# Patient Record
Sex: MALE | Race: WHITE | NOT HISPANIC OR LATINO | Employment: OTHER | URBAN - METROPOLITAN AREA
[De-identification: names, ages, dates, MRNs, and addresses within clinical notes are randomized per-mention and may not be internally consistent; named-entity substitution may affect disease eponyms.]

---

## 2022-03-10 ENCOUNTER — MEDICAL CORRESPONDENCE (OUTPATIENT)
Dept: HEALTH INFORMATION MANAGEMENT | Facility: CLINIC | Age: 52
End: 2022-03-10
Payer: COMMERCIAL

## 2022-04-04 ENCOUNTER — TRANSCRIBE ORDERS (OUTPATIENT)
Dept: OTHER | Age: 52
End: 2022-04-04

## 2022-04-04 DIAGNOSIS — H49.883 EXTERNAL OPHTHALMOPLEGIA OF BOTH EYES: Primary | ICD-10-CM

## 2022-04-04 DIAGNOSIS — H05.229: ICD-10-CM

## 2022-04-04 DIAGNOSIS — I63.81 LACUNAR INFARCTION (H): ICD-10-CM

## 2022-04-04 DIAGNOSIS — R26.89 IMBALANCE: ICD-10-CM

## 2022-04-04 DIAGNOSIS — H57.10: Primary | ICD-10-CM

## 2022-04-04 DIAGNOSIS — R41.3 MEMORY LOSS: ICD-10-CM

## 2022-05-05 ENCOUNTER — OFFICE VISIT (OUTPATIENT)
Dept: OPHTHALMOLOGY | Facility: CLINIC | Age: 52
End: 2022-05-05
Attending: PSYCHIATRY & NEUROLOGY
Payer: COMMERCIAL

## 2022-05-05 DIAGNOSIS — H53.10 SUBJECTIVE VISUAL DISTURBANCE: Primary | ICD-10-CM

## 2022-05-05 DIAGNOSIS — G23.1 PSP (PROGRESSIVE SUPRANUCLEAR PALSY) (H): Primary | ICD-10-CM

## 2022-05-05 DIAGNOSIS — R26.89 IMBALANCE: ICD-10-CM

## 2022-05-05 DIAGNOSIS — R41.3 MEMORY LOSS: ICD-10-CM

## 2022-05-05 DIAGNOSIS — H49.883 EXTERNAL OPHTHALMOPLEGIA OF BOTH EYES: ICD-10-CM

## 2022-05-05 DIAGNOSIS — I63.81 LACUNAR INFARCTION (H): ICD-10-CM

## 2022-05-05 PROCEDURE — 92060 SENSORIMOTOR EXAMINATION: CPT | Performed by: OPHTHALMOLOGY

## 2022-05-05 PROCEDURE — 92004 COMPRE OPH EXAM NEW PT 1/>: CPT | Performed by: OPHTHALMOLOGY

## 2022-05-05 PROCEDURE — G0463 HOSPITAL OUTPT CLINIC VISIT: HCPCS | Performed by: TECHNICIAN/TECHNOLOGIST

## 2022-05-05 ASSESSMENT — CONF VISUAL FIELD
OS_NORMAL: 1
METHOD: COUNTING FINGERS
OD_NORMAL: 1

## 2022-05-05 ASSESSMENT — VISUAL ACUITY
CORRECTION_TYPE: GLASSES
OS_CC: 20/60
METHOD: SNELLEN - LINEAR
OS_CC+: -2
OD_CC: 20/30
OD_CC+: -2

## 2022-05-05 ASSESSMENT — REFRACTION_WEARINGRX
SPECS_TYPE: PAL
OD_ADD: +2.25
OS_AXIS: 110
OD_AXIS: 110
OD_CYLINDER: +1.00
OS_CYLINDER: +0.75
OD_SPHERE: -1.00
OS_ADD: +2.25
OS_SPHERE: +0.25

## 2022-05-05 ASSESSMENT — TONOMETRY
OD_IOP_MMHG: 13
IOP_METHOD: ICARE
OS_IOP_MMHG: 12

## 2022-05-05 ASSESSMENT — SLIT LAMP EXAM - LIDS
COMMENTS: NORMAL
COMMENTS: NORMAL

## 2022-05-05 ASSESSMENT — EXTERNAL EXAM - LEFT EYE: OS_EXAM: NORMAL

## 2022-05-05 ASSESSMENT — EXTERNAL EXAM - RIGHT EYE: OD_EXAM: NORMAL

## 2022-05-05 NOTE — LETTER
"2022         RE:  :  MRN: Pepe Mccray  1970  [id number]     Dear Dr. Mendez,    Thank you for asking me to see your very pleasant patient, Pepe Mccray, in neuro-ophthalmic consultation.  I would like to thank you for sending your records and I have summarized them in the history of present illness.  My assessment and plan are below.  For further details, please see my attached clinic note.      Assessment & Plan     Pepe Mccray is a 52 year old male with the following diagnoses:   1. PSP (progressive supranuclear palsy) (H)    2. External ophthalmoplegia of both eyes    3. Lacunar infarction (H)    4. Memory loss    5. Imbalance         Patient was sent for consultation by Dr. Bayron Mendez for external ophthalmoplegia and concern for PSP.     HPI:  Patient fell and hit his head approximately 13 years ago and received a concussion. He has had decline with vision, movement, cognition and \"everything\" since then. His balance remains and he is able to use his hands well. Patient does use poles while walking his dogs.     Regarding his vision, everything is blurry whether he has one eye open or both. His left eye has worse vision on the right. It is tough for him to read because of his blurry vision and because he cannot make his eyes go down, requiring him to raise the book in front of his face. Does have a bit of \"wavyness\" in parts of vision as well.    Does get some eye itchiness and maybe some color washout.    Denies eye pain, redness, dryness, increase in floaters, flashing light, diplopia.    Independent historians: Patient  Review of outside testing:  MRI Brain without contrast 21  IMPRESSION:   1.  No finding for acute infarct, intracranial hemorrhage, or extra-axial collection. No findings to suggest an abnormal mass on this noncontrast brain MRI.     2.  Chronic lacunar infarct in the left jorge with a mild burden scattered chronic small vessel ischemic change and " the multiple small chronic lacunar infarcts in the frontal white matter bilaterally.     3.  Subcentimeter focus of susceptibility suggesting hemosiderin staining from chronic microhemorrhage in the medial left cerebellar hemisphere.     4.  Moderate diffuse parenchymal volume loss.    My interpretation performed today of outside testing:  Agree with atrophy of the jorge compared to the midbrain    Review of outside clinical notes:  Dr. Bayron Mendez clinic notes:  IMPRESSION:  The patient is a 51 year old RH man with a past medical history significant for heavy alcohol use and hypertension presenting in f/u with approximately 12 years of progressive cognitive problems following a concussion. Brain MRI was remarkable for the James Mouse sign and humming bird sign. CSF and blood markers were all unremarkable.    My impression is that the patient has progressive supranuclear palsy. I discussed the diagnosis and prognosis. I will send him for PT and OT. There are no swallowing issues at this time. I provided materials about remaining physically, mentally, and socially active. I provided materials about the brain gym.He should continue the B12 supplements.    The patient should follow through with his appointment with a neuro-ophthalmologist to confirm ophthalmoplegia.     I will reach out to [facility] about any research protocols.    The patient should follow up in 6 months.    Past medical history:  Heavy Alcohol Use  HTN  Concussion 12 years ago with declining function since     Medications:   This patient does not have an active medication from one of the medication groupers.    Family history / social history:  No ocular history  No history of dementia, degenerative disease, or supranuclear palsy  Brother bipolar   Alcohol use    Exam:  Visual acuity 20/30 right eye 20/60 left eye with no improvement with pinhole.  Color vision 11/11 right eye and 11/11 left eye.  Pupils equal and reactive without APD.   Intraocular pressure 13 right eye and 12 left eye. Extraocular movement -1 on vertical access, trac limitation on lateral axis. CVF with full visual fields. Strabismus exam essentially ortho. Anterior segment exam normal.     Tests ordered and interpreted today: None    Discussion of management / interpretation with another provider: Will discuss with Dr. Ryder    Assessment/Plan:   It is my impression that patient has progressive supranuclear palsy.  Outside MRI remarkable for James Mouse sign and humming bird sign. Patient with limitation on vertical and downward gaze which can be overcome with doll's head maneuver, suggesting supranuclear location of lesion.  He has fallen on several occasions which is consistent with his condition.  He fails the 3 clap test.  Discussed the progressive nature of the disease with patient as well as future expectations for vision. Will reach out to Dr. Ryder regarding my agreement with PSP diagnosis.    I do not recommend regular follow up with me, but I am happy to see patient again.        Again, thank you for allowing me to participate in the care of your patient.      Sincerely,    Bayron Cervantes MD  Professor  Ophthalmology Residency   Director of Neuro-Ophthalmology  [employer] Endowed Chair  Departments of Ophthalmology, Neurology, and Neurosurgery  [facility] [address] [place] [address]  [id number]  T - [phone]  F - [phone]  DEE DEE [email or web address]      CC: Martin Carmen MD  [facility]  [address]  [place] [address]  Via Fax: [phone]     Bayron Mendez  [address]  [place] [address]  [address]    DX = PSP

## 2022-05-05 NOTE — PROGRESS NOTES
"     Assessment & Plan     Pepe Mccray is a 52 year old male with the following diagnoses:   1. PSP (progressive supranuclear palsy) (H)    2. External ophthalmoplegia of both eyes    3. Lacunar infarction (H)    4. Memory loss    5. Imbalance         Patient was sent for consultation by Dr. Bayron Mendez for external ophthalmoplegia and concern for PSP.     HPI:  Patient fell and hit his head approximately 13 years ago and received a concussion. He has had decline with vision, movement, cognition and \"everything\" since then. His balance remains and he is able to use his hands well. Patient does use poles while walking his dogs.     Regarding his vision, everything is blurry whether he has one eye open or both. His left eye has worse vision on the right. It is tough for him to read because of his blurry vision and because he cannot make his eyes go down, requiring him to raise the book in front of his face. Does have a bit of \"wavyness\" in parts of vision as well.    Does get some eye itchiness and maybe some color washout.    Denies eye pain, redness, dryness, increase in floaters, flashing light, diplopia    Independent historians:  Patient    Review of outside testing:  MRI Brain without contrast 12/28/21  IMPRESSION:   1.  No finding for acute infarct, intracranial hemorrhage, or extra-axial collection. No findings to suggest an abnormal mass on this noncontrast brain MRI.     2.  Chronic lacunar infarct in the left jorge with a mild burden scattered chronic small vessel ischemic change and the multiple small chronic lacunar infarcts in the frontal white matter bilaterally.     3.  Subcentimeter focus of susceptibility suggesting hemosiderin staining from chronic microhemorrhage in the medial left cerebellar hemisphere.     4.  Moderate diffuse parenchymal volume loss.      My interpretation performed today of outside testing:  Agree with atrophy of the jorge compared to the midbrain      Review of " outside clinical notes:  Dr. Bayron Mendez clinic notes:  IMPRESSION:  The patient is a 51 year old  man with a past medical history significant for heavy alcohol use and hypertension presenting in f/u with approximately 12 years of progressive cognitive problems following a concussion. Brain MRI was remarkable for the James Mouse sign and humming bird sign. CSF and blood markers were all unremarkable.    My impression is that the patient has progressive supranuclear palsy. I discussed the diagnosis and prognosis. I will send him for PT and OT. There are no swallowing issues at this time. I provided materials about remaining physically, mentally, and socially active. I provided materials about the brain gym.He should continue the B12 supplements.    The patient should follow through with his appointment with a neuro-ophthalmologist to confirm ophthalmoplegia.     I will reach out to [facility] about any research protocols.    The patient should follow up in 6 months.      Past medical history:  Heavy Alcohol Use  HTN  Concussion 12 years ago with declining function since     Medications:   This patient does not have an active medication from one of the medication groupers.    Family history / social history:  No ocular history  No history of dementia, degenerative disease, or supranuclear palsy  Brother bipolar   Alcohol use      Exam:  Visual acuity 20/30 right eye 20/60 left eye with no improvement with pinhole.  Color vision 11/11 right eye and 11/11 left eye.  Pupils equal and reactive without APD.  Intraocular pressure 13 right eye and 12 left eye. Extraocular movement -1 on vertical access, trac limitation on lateral axis. CVF with full visual fields. Strabismus exam essentially ortho. Anterior segment exam normal.  Fundus exam was unremarkable..      Tests ordered and interpreted today:  None    Discussion of management / interpretation with another provider:   Will discuss with   Aleksey    Assessment/Plan:   It is my impression that patient has progressive supranuclear palsy.  Outside MRI remarkable for James Mouse sign and humming bird sign. Patient with limitation on vertical and downward gaze which can be overcome with doll's head maneuver, suggesting supranuclear location of lesion.  He has fallen on several occasions which is consistent with his condition.  He fails the 3 clap test.  Discussed the progressive nature of the disease with patient as well as future expectations for vision. Will reach out to Dr. Ryder regarding my agreement with PSP diagnosis.    I do not recommend regular follow up with me, but I am happy to see patient again.       Attending Physician Attestation:  Complete documentation of historical and exam elements from today's encounter can be found in the full encounter summary report (not reduplicated in this progress note).  I personally obtained the chief complaint(s) and history of present illness.  I confirmed and edited as necessary the review of systems, past medical/surgical history, family history, social history, and examination findings as documented by others; and I examined the patient myself.  I personally reviewed the relevant tests, images, and reports as documented above.  I formulated and edited as necessary the assessment and plan and discussed the findings and management plan with the patient and family. I was present with the medical student who participated in the service and in the documentation of this note. - MD Salvador Chavarria MS4, [employer]

## 2022-05-07 ENCOUNTER — HEALTH MAINTENANCE LETTER (OUTPATIENT)
Age: 52
End: 2022-05-07

## 2023-01-01 ENCOUNTER — HEALTH MAINTENANCE LETTER (OUTPATIENT)
Age: 53
End: 2023-01-01